# Patient Record
Sex: MALE | Race: WHITE | NOT HISPANIC OR LATINO | Employment: UNEMPLOYED | URBAN - METROPOLITAN AREA
[De-identification: names, ages, dates, MRNs, and addresses within clinical notes are randomized per-mention and may not be internally consistent; named-entity substitution may affect disease eponyms.]

---

## 2020-03-23 ENCOUNTER — TELEPHONE (OUTPATIENT)
Dept: GASTROENTEROLOGY | Facility: CLINIC | Age: 30
End: 2020-03-23

## 2020-03-25 NOTE — TELEPHONE ENCOUNTER
----- Message from Belkys Syed MD sent at 3/23/2020  2:11 PM EDT -----  Regarding: RE: Appt request  Contact: [phone]  I have not received any records or information on this patient.  Please request records from her previous GI physician and reason for second opinion.  Thank you      ----- Message -----  From: Marissa Choudhary  Sent: 3/23/2020  12:03 PM EDT  To: Belkys Syed MD, Isabel Sherman  Subject: Appt request                                     Elizabeth from [facility] ([phone]) is calling regarding appt with Dr Syed. She states she spoke with this office one of Dr Syed assistants and sent the medical records for a second opinion on March 5th. She is asking the status?         
Call to pt.  Advise per Dr Syed note.  Asking if appt earlier on any day possible.      Question to Dr Syed.   
Called pt and advised of Dr Syed's note.     Pt verb understanding and does want appt on 04/28 [id number].  Update sent to Dr Syed.   
Called pt and left vm for pt to call back.   
Called pt and left vm for pt to call back.   
Called pt and pt reports he sees Jaycee Cabrera [facility] office .  Called Elizabeth at [phone] and she is going to fax us the records.       Records received and scanned under media tab. Dr Syed notified and hard copy placed on her desk.   
Records reviewed-okay to schedule second opinion.  Appointment scheduled for 428 at 145.  Please let me know if this date/time does not work  
We are not seeing nonemergent or nonurgent patients in the office for 30 days due to covid  - if he has urrgent needs he will need to see the provider he is established with  
Statement Selected

## 2020-07-01 ENCOUNTER — OFFICE VISIT (OUTPATIENT)
Dept: GASTROENTEROLOGY | Facility: CLINIC | Age: 30
End: 2020-07-01

## 2020-07-01 ENCOUNTER — TELEPHONE (OUTPATIENT)
Dept: GASTROENTEROLOGY | Facility: CLINIC | Age: 30
End: 2020-07-01

## 2020-07-01 VITALS — HEIGHT: 74 IN | TEMPERATURE: 98 F | BODY MASS INDEX: 30.67 KG/M2 | WEIGHT: 239 LBS

## 2020-07-01 DIAGNOSIS — K59.1 FUNCTIONAL DIARRHEA: ICD-10-CM

## 2020-07-01 DIAGNOSIS — R10.11 RUQ PAIN: Primary | ICD-10-CM

## 2020-07-01 DIAGNOSIS — R12 HEARTBURN: ICD-10-CM

## 2020-07-01 PROCEDURE — 99204 OFFICE O/P NEW MOD 45 MIN: CPT | Performed by: INTERNAL MEDICINE

## 2020-07-01 RX ORDER — HYOSCYAMINE SULFATE 0.12 MG/1
0.12 TABLET SUBLINGUAL EVERY 6 HOURS PRN
Qty: 60 EACH | Refills: 3 | Status: SHIPPED | OUTPATIENT
Start: 2020-07-01 | End: 2020-07-05 | Stop reason: CLARIF

## 2020-07-01 RX ORDER — OMEPRAZOLE 40 MG/1
CAPSULE, DELAYED RELEASE ORAL
COMMUNITY
Start: 2020-06-24 | End: 2020-11-23

## 2020-07-01 RX ORDER — CETIRIZINE HYDROCHLORIDE 10 MG/1
10 TABLET ORAL DAILY
COMMUNITY

## 2020-07-01 RX ORDER — ESCITALOPRAM OXALATE 10 MG/1
10 TABLET ORAL DAILY
COMMUNITY
Start: 2020-04-20

## 2020-07-01 NOTE — PROGRESS NOTES
Subjective   Chief Complaint   Patient presents with   • Abdominal Pain   • Diarrhea   • Heartburn       Mike Ortega is a  29 y.o. male here for an initial consultation for abdominal pain, diarrhea and heartburn.    He has longstanding heartburn.  Currently controlled with omeprazole once a day.  If he misses it for day or 2 he has intense heartburn.    Complains of abdominal pain oftentimes in the right upper quadrant.  He feels some gurgling and intestinal movement when he pushes on this.  It is sometimes relieved by bowel movement.  He reports erratic bowel movements.  He often times happens postprandial urgent bowel movements that he describes as loose and watery.  No blood in the stool.  He sometimes will have more normal bowel movements that he describes as mushy.    He has tried probiotics but no improvement.      He has had his gallbladder removed since the below workup due to the polyp.  He notes a little more cramping since gb removed.    Previous w/u:  GB u/s 6/19 - small GB polyp  HIDA 10/18 - normal w/ef 96%  CT 7/18 - normal   Colonoscopy 8/19 - [facility] - normal random colon bx  Celiac ab panel 11/18 normal  EGD 7/18- Normal - bx negative for h pylori, celiac  HPI  Past Medical History:   Diagnosis Date   • Allergic rhinitis    • GERD (gastroesophageal reflux disease)      Past Surgical History:   Procedure Laterality Date   • CHOLECYSTECTOMY     • COLONOSCOPY  approx 3/2020    normal per patient   • ROTATOR CUFF REPAIR     • TONSILLECTOMY     • UPPER GASTROINTESTINAL ENDOSCOPY  approx 1/2020    normal per patient       Current Outpatient Medications:   •  cetirizine (zyrTEC) 10 MG tablet, Take 10 mg by mouth Daily., Disp: , Rfl:   •  escitalopram (LEXAPRO) 10 MG tablet, Take 10 mg by mouth Daily., Disp: , Rfl:   •  omeprazole (priLOSEC) 40 MG capsule, TAKE 1 CAPSULE BY MOUTH EVERY DAY, TAKE 30 MINUTES BEFORE BREAKFAST, Disp: , Rfl:   •  Hyoscyamine Sulfate SL 0.125 MG sublingual tablet, Place 0.125 mg  under the tongue Every 6 (Six) Hours As Needed (abdominal pain)., Disp: 60 each, Rfl: 3  PRN Meds:.  No Known Allergies  Social History     Socioeconomic History   • Marital status:      Spouse name: Not on file   • Number of children: Not on file   • Years of education: Not on file   • Highest education level: Not on file   Tobacco Use   • Smoking status: Current Some Day Smoker   • Smokeless tobacco: Never Used   • Tobacco comment: social   Substance and Sexual Activity   • Alcohol use: Yes     Comment: social   • Drug use: Never     History reviewed. No pertinent family history.  Review of Systems   Constitutional: Negative for appetite change and unexpected weight change.   Gastrointestinal: Positive for abdominal pain and diarrhea.   All other systems reviewed and are negative.    Vitals:    07/01/20 1449   Temp: 98 °F (36.7 °C)         07/01/20  1449   Weight: 108 kg (239 lb)       Objective   Physical Exam   Constitutional: He appears well-developed and well-nourished.   HENT:   Head: Normocephalic and atraumatic.   Eyes: No scleral icterus.   Pulmonary/Chest: Effort normal. No respiratory distress.   Abdominal: Soft. He exhibits no distension and no mass. There is tenderness.       Neurological: He is alert.   Skin: Skin is warm and dry.   Psychiatric: He has a normal mood and affect.     No radiology results for the last 7 days    Assessment/Plan   Diagnoses and all orders for this visit:    RUQ pain  -     Lipase  -     Comprehensive Metabolic Panel    Functional diarrhea    Heartburn    Other orders  -     escitalopram (LEXAPRO) 10 MG tablet; Take 10 mg by mouth Daily.  -     omeprazole (priLOSEC) 40 MG capsule; TAKE 1 CAPSULE BY MOUTH EVERY DAY, TAKE 30 MINUTES BEFORE BREAKFAST  -     cetirizine (zyrTEC) 10 MG tablet; Take 10 mg by mouth Daily.  -     Hyoscyamine Sulfate SL 0.125 MG sublingual tablet; Place 0.125 mg under the tongue Every 6 (Six) Hours As Needed (abdominal  pain).      Plan:  · Will test for SIBO-he has had a fairly normal work-up to date for functional diarrhea with no evidence of inflammation  · He will call with his home medication that he is taking for diarrhea (it is a powder-fiber versus cholestyramine?)  · Continue omeprazole daily  · Trial antispasmodic as needed for right upper quadrant pain.  If no improvement with improvement in his bowel habits and antispasmodic, consider MRCP for further evaluation

## 2020-07-02 ENCOUNTER — TELEPHONE (OUTPATIENT)
Dept: GASTROENTEROLOGY | Facility: CLINIC | Age: 30
End: 2020-07-02

## 2020-07-02 ENCOUNTER — PRIOR AUTHORIZATION (OUTPATIENT)
Dept: GASTROENTEROLOGY | Facility: CLINIC | Age: 30
End: 2020-07-02

## 2020-07-02 LAB
ALBUMIN SERPL-MCNC: 4.8 G/DL (ref 3.5–5.2)
ALBUMIN/GLOB SERPL: 2.2 G/DL
ALP SERPL-CCNC: 52 U/L (ref 39–117)
ALT SERPL-CCNC: 39 U/L (ref 1–41)
AST SERPL-CCNC: 21 U/L (ref 1–40)
BILIRUB SERPL-MCNC: 0.5 MG/DL (ref 0.2–1.2)
BUN SERPL-MCNC: 16 MG/DL (ref 6–20)
BUN/CREAT SERPL: 14.5 (ref 7–25)
CALCIUM SERPL-MCNC: 9.4 MG/DL (ref 8.6–10.5)
CHLORIDE SERPL-SCNC: 102 MMOL/L (ref 98–107)
CO2 SERPL-SCNC: 25.2 MMOL/L (ref 22–29)
CREAT SERPL-MCNC: 1.1 MG/DL (ref 0.76–1.27)
GLOBULIN SER CALC-MCNC: 2.2 GM/DL
GLUCOSE SERPL-MCNC: 90 MG/DL (ref 65–99)
LIPASE SERPL-CCNC: 21 U/L (ref 13–60)
POTASSIUM SERPL-SCNC: 4.3 MMOL/L (ref 3.5–5.2)
PROT SERPL-MCNC: 7 G/DL (ref 6–8.5)
SODIUM SERPL-SCNC: 139 MMOL/L (ref 136–145)

## 2020-07-02 NOTE — TELEPHONE ENCOUNTER
Call to pt.  Advise per DR Syed note.  Verb understanding.     States was to call back with type of fiber taking - states is polyethylene glycol.  Used one scoop every morning for about 2 weeks and did not note any improvement, there stopped taking.     Update to Dr Syed.

## 2020-07-05 RX ORDER — DICYCLOMINE HCL 20 MG
20 TABLET ORAL
Qty: 60 TABLET | Refills: 5 | Status: SHIPPED | OUTPATIENT
Start: 2020-07-05 | End: 2021-01-25 | Stop reason: SDUPTHER

## 2020-07-05 NOTE — TELEPHONE ENCOUNTER
Bentyl ordered instead as hyoscyamine denied    Recommend trial benefiber daily for abnormal BMs

## 2020-10-12 ENCOUNTER — TELEPHONE (OUTPATIENT)
Dept: GASTROENTEROLOGY | Facility: CLINIC | Age: 30
End: 2020-10-12

## 2020-10-12 NOTE — TELEPHONE ENCOUNTER
SIBO was found on recen ttesting - xifaxan sent to pharmacy - take x 2 weeks - office f/u with NP 4-6 weeks

## 2020-10-13 ENCOUNTER — PRIOR AUTHORIZATION (OUTPATIENT)
Dept: GASTROENTEROLOGY | Facility: CLINIC | Age: 30
End: 2020-10-13

## 2020-10-14 NOTE — TELEPHONE ENCOUNTER
Called pt and advised of Dr Syed's note. Pt verb understanding and made appt for 11/23 at 315 with Janneth CANTU.

## 2020-11-17 ENCOUNTER — TELEPHONE (OUTPATIENT)
Dept: GASTROENTEROLOGY | Facility: CLINIC | Age: 30
End: 2020-11-17

## 2020-11-17 NOTE — TELEPHONE ENCOUNTER
----- Message from Christian Cervantes sent at 11/16/2020  3:54 PM EST -----  Regarding: xifaxin  Contact: [phone]  Pt says he is finished with the xifaxin. He said while he was on it he felt better but since finishing the medication the symptoms are coming back.

## 2020-11-17 NOTE — TELEPHONE ENCOUNTER
If he did really good on the first round he needs to go ahead and take the second round in the third round.  Have him follow-up with me after he is finished the third round.  Thanks

## 2020-11-17 NOTE — TELEPHONE ENCOUNTER
Called pt and advised of Janneth's note. Pt verb understanding but reports he does not have any refills.  ADvised will send message to Janneth NP to send another script.

## 2020-11-23 ENCOUNTER — OFFICE VISIT (OUTPATIENT)
Dept: GASTROENTEROLOGY | Facility: CLINIC | Age: 30
End: 2020-11-23

## 2020-11-23 VITALS — WEIGHT: 243.2 LBS | BODY MASS INDEX: 31.21 KG/M2 | HEIGHT: 74 IN | TEMPERATURE: 98 F

## 2020-11-23 DIAGNOSIS — K63.89 SMALL INTESTINAL BACTERIAL OVERGROWTH: ICD-10-CM

## 2020-11-23 DIAGNOSIS — K21.9 GASTROESOPHAGEAL REFLUX DISEASE WITHOUT ESOPHAGITIS: ICD-10-CM

## 2020-11-23 DIAGNOSIS — K58.0 IRRITABLE BOWEL SYNDROME WITH DIARRHEA: Primary | ICD-10-CM

## 2020-11-23 DIAGNOSIS — R10.11 RIGHT UPPER QUADRANT ABDOMINAL PAIN: ICD-10-CM

## 2020-11-23 PROCEDURE — 99214 OFFICE O/P EST MOD 30 MIN: CPT | Performed by: NURSE PRACTITIONER

## 2020-11-23 NOTE — PROGRESS NOTES
Chief Complaint   Patient presents with   • Follow-up     4 month    • Abdominal Pain       Mike Ortega is a  30 y.o. male here for a follow up visit for abdominal pain.    HPI  30-year-old male presents today for follow-up visit for abdominal pain.  He is a patient of Dr. Syed.  He is new to me.  He was last seen in the office on 7/1/2020.  At that time he tested positive for SIBO.  He received 1 round of Xifaxan.  He admits it really seem to clear of all of his symptoms.  But unfortunately a couple of weeks after finishing it all of his symptoms returned.  He was supposed to get 2 more refills but unfortunately there was an issue with his insurance and he never received it.  He does continue to have lots of gas, bloating upper abdominal pain and loose stools.  He definitely has fecal urgency as soon as he eats he has to run to the bathroom.  He denies any episodes of fecal incontinence.  He does have history of cholecystectomy.  Last colonoscopy was on 3/2020.  Was normal per patient report.  Last EGD was done on 1/2020 which was normal per patient report.  He denies any dysphagia, reflux, nausea and vomiting, rectal bleeding or melena.  Admits his appetite is good and his weight is stable.  He does have a history of GERD but admits lately he is controlling it with diet.  He is no longer needing to take any omeprazole.  Past Medical History:   Diagnosis Date   • Allergic rhinitis    • GERD (gastroesophageal reflux disease)        Past Surgical History:   Procedure Laterality Date   • CHOLECYSTECTOMY     • COLONOSCOPY  approx 3/2020    normal per patient   • ROTATOR CUFF REPAIR     • TONSILLECTOMY     • UPPER GASTROINTESTINAL ENDOSCOPY  approx 1/2020    normal per patient       Scheduled Meds:    Continuous Infusions:No current facility-administered medications for this visit.       PRN Meds:.    No Known Allergies    Social History     Socioeconomic History   • Marital status:      Spouse name: Not on  file   • Number of children: Not on file   • Years of education: Not on file   • Highest education level: Not on file   Tobacco Use   • Smoking status: Never Smoker   • Smokeless tobacco: Never Used   • Tobacco comment: social   Substance and Sexual Activity   • Alcohol use: Yes     Comment: social   • Drug use: Never       Family History   Problem Relation Age of Onset   • No Known Problems Mother    • No Known Problems Father    • No Known Problems Sister    • No Known Problems Brother    • No Known Problems Maternal Aunt    • No Known Problems Maternal Uncle    • No Known Problems Paternal Aunt    • No Known Problems Paternal Uncle    • No Known Problems Maternal Grandmother    • No Known Problems Maternal Grandfather    • No Known Problems Paternal Grandmother    • No Known Problems Paternal Grandfather    • No Known Problems Other    • Celiac disease Neg Hx    • Cirrhosis Neg Hx    • Colon cancer Neg Hx    • Colon polyps Neg Hx    • Crohn's disease Neg Hx    • Cystic fibrosis Neg Hx    • Esophageal cancer Neg Hx    • Hemochromatosis Neg Hx    • Inflammatory bowel disease Neg Hx    • Irritable bowel syndrome Neg Hx    • Liver cancer Neg Hx    • Liver disease Neg Hx    • Rectal cancer Neg Hx    • Stomach cancer Neg Hx    • Ulcerative colitis Neg Hx    • Haim's disease Neg Hx    • Alcohol abuse Neg Hx    • Pancreatitis Neg Hx        Review of Systems   Constitutional: Negative for appetite change, chills, diaphoresis, fatigue, fever and unexpected weight change.   HENT: Negative for nosebleeds, postnasal drip, sore throat, trouble swallowing and voice change.    Respiratory: Negative for cough, choking, chest tightness, shortness of breath and wheezing.    Cardiovascular: Negative for chest pain, palpitations and leg swelling.   Gastrointestinal: Positive for abdominal distention, diarrhea and nausea. Negative for abdominal pain, anal bleeding, blood in stool, constipation, rectal pain and vomiting.   Endocrine:  Negative for polydipsia, polyphagia and polyuria.   Musculoskeletal: Negative for gait problem.   Skin: Negative for rash and wound.   Allergic/Immunologic: Negative for food allergies.   Neurological: Negative for dizziness, speech difficulty and light-headedness.   Psychiatric/Behavioral: Negative for confusion, self-injury, sleep disturbance and suicidal ideas.       Vitals:    11/23/20 1515   Temp: 98 °F (36.7 °C)       Physical Exam  Constitutional:       General: He is not in acute distress.     Appearance: He is well-developed. He is not ill-appearing.   HENT:      Head: Normocephalic.   Eyes:      Pupils: Pupils are equal, round, and reactive to light.   Cardiovascular:      Rate and Rhythm: Normal rate and regular rhythm.      Heart sounds: Normal heart sounds.   Pulmonary:      Effort: Pulmonary effort is normal.      Breath sounds: Normal breath sounds.   Abdominal:      General: Bowel sounds are normal. There is distension.      Palpations: Abdomen is soft. There is no mass.      Tenderness: There is no abdominal tenderness. There is no guarding or rebound.      Hernia: No hernia is present.   Musculoskeletal: Normal range of motion.   Skin:     General: Skin is warm and dry.   Neurological:      Mental Status: He is alert and oriented to person, place, and time.   Psychiatric:         Speech: Speech normal.         Behavior: Behavior normal.         Judgment: Judgment normal.         No radiology results for the last 7 days     Assessment and plan     1. Irritable bowel syndrome with diarrhea    2. Small intestinal bacterial overgrowth    3. Gastroesophageal reflux disease without esophagitis    4. RUQ abd pain    Sounds like Xifaxan really worked well for him.  Unfortunately he did not get 3 full rounds of it.  His insurance requires another prior authorization first.  Go ahead and give him 2 boxes of samples to start.  We will also give him a coupon discount card.  Will have nursing resubmit another  PA.  Patient to call the office in a couple weeks with an update.  Patient to follow-up with me in 2 months.  Patient is agreeable to the plan.

## 2020-11-30 ENCOUNTER — PRIOR AUTHORIZATION (OUTPATIENT)
Dept: GASTROENTEROLOGY | Facility: CLINIC | Age: 30
End: 2020-11-30

## 2020-11-30 NOTE — TELEPHONE ENCOUNTER
[facility]    Key: [id number]      [facility]:   Authorization already on file for this request.

## 2020-12-30 ENCOUNTER — TELEPHONE (OUTPATIENT)
Dept: GASTROENTEROLOGY | Facility: CLINIC | Age: 30
End: 2020-12-30

## 2021-01-25 ENCOUNTER — OFFICE VISIT (OUTPATIENT)
Dept: GASTROENTEROLOGY | Facility: CLINIC | Age: 31
End: 2021-01-25

## 2021-01-25 VITALS — BODY MASS INDEX: 31.78 KG/M2 | HEIGHT: 74 IN | TEMPERATURE: 98.1 F | WEIGHT: 247.6 LBS

## 2021-01-25 DIAGNOSIS — K63.89 SMALL INTESTINAL BACTERIAL OVERGROWTH: ICD-10-CM

## 2021-01-25 DIAGNOSIS — R10.11 RUQ PAIN: ICD-10-CM

## 2021-01-25 DIAGNOSIS — K58.0 IRRITABLE BOWEL SYNDROME WITH DIARRHEA: Primary | ICD-10-CM

## 2021-01-25 PROCEDURE — 99213 OFFICE O/P EST LOW 20 MIN: CPT | Performed by: INTERNAL MEDICINE

## 2021-01-25 RX ORDER — DICYCLOMINE HCL 20 MG
20 TABLET ORAL
Qty: 60 TABLET | Refills: 5 | Status: SHIPPED | OUTPATIENT
Start: 2021-01-25

## 2021-01-25 NOTE — PROGRESS NOTES
Subjective   Chief Complaint   Patient presents with   • Abdominal Pain   • Irritable Bowel Syndrome   • Diarrhea       Mike Ortega is a  30 y.o. male here for a follow up visit for IBS-D, abdominal pain.  Patient was last seen in the office in November by Rosanna MOREL.    He has a history of positive SIBO testing.  He initially responded to Xifaxan with recurrence of the symptoms.  He took a second round of Xifaxan but again noticed recurrence as soon as he finished medication.  He has not tried the Bentyl.  He reports mushy stools.  He also has some right upper quadrant pain that is intermittent.  He reports that this was present prior to his gallbladder being removed.  It happens whether he is eaten or fasted.  Reports that he is really tried to clean up his diet and he has quit smoking and is watching his diet and exercising daily.      Previous w/u:  GB u/s 6/19 - small GB polyp  HIDA 10/18 - normal w/ef 96%  CT 7/18 - normal   Colonoscopy 8/19 - [facility] - normal random colon bx  Celiac ab panel 11/18 normal  EGD 7/18- Normal - bx negative for h pylori, celiac  SIBO testing 10/20 - +    HPI  Past Medical History:   Diagnosis Date   • Allergic rhinitis    • GERD (gastroesophageal reflux disease)      Past Surgical History:   Procedure Laterality Date   • CHOLECYSTECTOMY     • COLONOSCOPY  approx 3/2020    normal per patient   • ROTATOR CUFF REPAIR     • TONSILLECTOMY     • UPPER GASTROINTESTINAL ENDOSCOPY  approx 1/2020    normal per patient       Current Outpatient Medications:   •  cetirizine (zyrTEC) 10 MG tablet, Take 10 mg by mouth Daily., Disp: , Rfl:   •  riFAXIMin (Xifaxan) 550 MG tablet, Take 1 tablet by mouth Every 8 (Eight) Hours., Disp: 42 tablet, Rfl: 2  •  dicyclomine (BENTYL) 20 MG tablet, Take 1 tablet by mouth 4 (Four) Times a Day Before Meals & at Bedtime As Needed (cramping, abdominal pain related to diarrhea)., Disp: 60 tablet, Rfl: 5  •  escitalopram (LEXAPRO) 10 MG tablet, Take 10 mg by  mouth Daily., Disp: , Rfl:   PRN Meds:.  No Known Allergies  Social History     Socioeconomic History   • Marital status:      Spouse name: Not on file   • Number of children: Not on file   • Years of education: Not on file   • Highest education level: Not on file   Tobacco Use   • Smoking status: Never Smoker   • Smokeless tobacco: Never Used   • Tobacco comment: social   Substance and Sexual Activity   • Alcohol use: Yes     Comment: social   • Drug use: Never     Family History   Problem Relation Age of Onset   • No Known Problems Mother    • No Known Problems Father    • No Known Problems Sister    • No Known Problems Brother    • No Known Problems Maternal Aunt    • No Known Problems Maternal Uncle    • No Known Problems Paternal Aunt    • No Known Problems Paternal Uncle    • No Known Problems Maternal Grandmother    • No Known Problems Maternal Grandfather    • No Known Problems Paternal Grandmother    • No Known Problems Paternal Grandfather    • No Known Problems Other    • Celiac disease Neg Hx    • Cirrhosis Neg Hx    • Colon cancer Neg Hx    • Colon polyps Neg Hx    • Crohn's disease Neg Hx    • Cystic fibrosis Neg Hx    • Esophageal cancer Neg Hx    • Hemochromatosis Neg Hx    • Inflammatory bowel disease Neg Hx    • Irritable bowel syndrome Neg Hx    • Liver cancer Neg Hx    • Liver disease Neg Hx    • Rectal cancer Neg Hx    • Stomach cancer Neg Hx    • Ulcerative colitis Neg Hx    • Haim's disease Neg Hx    • Alcohol abuse Neg Hx    • Pancreatitis Neg Hx      Review of Systems   Constitutional: Negative for appetite change and unexpected weight change.   Gastrointestinal: Positive for abdominal pain.     Vitals:    01/25/21 1038   Temp: 98.1 °F (36.7 °C)         01/25/21  1038   Weight: 112 kg (247 lb 9.6 oz)       Objective   Physical Exam  Constitutional:       Appearance: Normal appearance.   Abdominal:      General: There is no distension.      Palpations: Abdomen is soft.    Neurological:      Mental Status: He is alert.       No radiology results for the last 7 days    Assessment/Plan   Diagnoses and all orders for this visit:    Irritable bowel syndrome with diarrhea    Small intestinal bacterial overgrowth    RUQ pain  -     US Liver; Future    Other orders  -     dicyclomine (BENTYL) 20 MG tablet; Take 1 tablet by mouth 4 (Four) Times a Day Before Meals & at Bedtime As Needed (cramping, abdominal pain related to diarrhea).      Plan:  · Recommend trial of dairy free diet  · RUQ ultrasound to be scheduled-normal LFTs 7/20  · Continue healthy diet, regular exercise  · Recommend trial Bentyl-start with once daily and increase as needed  · Repeat trial Xifaxan for SIBO